# Patient Record
Sex: MALE | Race: WHITE | NOT HISPANIC OR LATINO
[De-identification: names, ages, dates, MRNs, and addresses within clinical notes are randomized per-mention and may not be internally consistent; named-entity substitution may affect disease eponyms.]

---

## 2021-05-10 ENCOUNTER — NON-APPOINTMENT (OUTPATIENT)
Age: 81
End: 2021-05-10

## 2021-05-10 DIAGNOSIS — Z86.79 PERSONAL HISTORY OF OTHER DISEASES OF THE CIRCULATORY SYSTEM: ICD-10-CM

## 2021-05-10 DIAGNOSIS — Z82.49 FAMILY HISTORY OF ISCHEMIC HEART DISEASE AND OTHER DISEASES OF THE CIRCULATORY SYSTEM: ICD-10-CM

## 2021-05-10 DIAGNOSIS — Z78.9 OTHER SPECIFIED HEALTH STATUS: ICD-10-CM

## 2021-05-10 DIAGNOSIS — F01.50 VASCULAR DEMENTIA W/OUT BEHAVIORAL DISTURBANCE: ICD-10-CM

## 2021-05-10 DIAGNOSIS — Z86.73 PERSONAL HISTORY OF TRANSIENT ISCHEMIC ATTACK (TIA), AND CEREBRAL INFARCTION W/OUT RESIDUAL DEFICITS: ICD-10-CM

## 2021-05-10 DIAGNOSIS — Z85.828 PERSONAL HISTORY OF OTHER MALIGNANT NEOPLASM OF SKIN: ICD-10-CM

## 2021-05-10 DIAGNOSIS — Z86.69 PERSONAL HISTORY OF OTHER DISEASES OF THE NERVOUS SYSTEM AND SENSE ORGANS: ICD-10-CM

## 2021-05-10 DIAGNOSIS — Z86.59 PERSONAL HISTORY OF OTHER MENTAL AND BEHAVIORAL DISORDERS: ICD-10-CM

## 2021-05-10 DIAGNOSIS — Z87.09 PERSONAL HISTORY OF OTHER DISEASES OF THE RESPIRATORY SYSTEM: ICD-10-CM

## 2021-05-10 DIAGNOSIS — Z87.448 PERSONAL HISTORY OF OTHER DISEASES OF URINARY SYSTEM: ICD-10-CM

## 2021-05-10 DIAGNOSIS — I10 ESSENTIAL (PRIMARY) HYPERTENSION: ICD-10-CM

## 2021-05-10 DIAGNOSIS — Z87.39 PERSONAL HISTORY OF OTHER DISEASES OF THE MUSCULOSKELETAL SYSTEM AND CONNECTIVE TISSUE: ICD-10-CM

## 2021-05-10 DIAGNOSIS — M54.18 RADICULOPATHY, SACRAL AND SACROCOCCYGEAL REGION: ICD-10-CM

## 2021-05-10 DIAGNOSIS — Z87.438 PERSONAL HISTORY OF OTHER DISEASES OF MALE GENITAL ORGANS: ICD-10-CM

## 2021-05-10 DIAGNOSIS — Z87.2 PERSONAL HISTORY OF DISEASES OF THE SKIN AND SUBCUTANEOUS TISSUE: ICD-10-CM

## 2021-05-10 DIAGNOSIS — M10.9 GOUT, UNSPECIFIED: ICD-10-CM

## 2021-05-10 DIAGNOSIS — E03.9 HYPOTHYROIDISM, UNSPECIFIED: ICD-10-CM

## 2021-05-10 PROBLEM — Z00.00 ENCOUNTER FOR PREVENTIVE HEALTH EXAMINATION: Status: ACTIVE | Noted: 2021-05-10

## 2021-05-10 RX ORDER — LOSARTAN POTASSIUM 100 MG/1
100 TABLET, FILM COATED ORAL
Refills: 0 | Status: ACTIVE | COMMUNITY

## 2021-05-10 RX ORDER — SIMVASTATIN 40 MG/1
40 TABLET, FILM COATED ORAL
Refills: 0 | Status: ACTIVE | COMMUNITY

## 2021-05-10 RX ORDER — ALBUTEROL SULFATE 108 UG/1
108 (90 BASE) AEROSOL, METERED RESPIRATORY (INHALATION)
Refills: 0 | Status: ACTIVE | COMMUNITY

## 2021-05-10 RX ORDER — TAMSULOSIN HCL 0.4 MG
0.4 CAPSULE ORAL
Refills: 0 | Status: ACTIVE | COMMUNITY

## 2021-05-10 RX ORDER — FLUTICASONE PROPIONATE 50 MCG
50 SPRAY, SUSPENSION NASAL
Refills: 0 | Status: ACTIVE | COMMUNITY

## 2021-05-13 ENCOUNTER — APPOINTMENT (OUTPATIENT)
Dept: GASTROENTEROLOGY | Facility: CLINIC | Age: 81
End: 2021-05-13
Payer: MEDICARE

## 2021-05-13 VITALS
WEIGHT: 224 LBS | HEART RATE: 80 BPM | TEMPERATURE: 98.2 F | DIASTOLIC BLOOD PRESSURE: 70 MMHG | HEIGHT: 70 IN | BODY MASS INDEX: 32.07 KG/M2 | SYSTOLIC BLOOD PRESSURE: 124 MMHG

## 2021-05-13 PROBLEM — Z87.438 HISTORY OF BENIGN PROSTATIC HYPERPLASIA: Status: RESOLVED | Noted: 2021-05-13 | Resolved: 2021-05-13

## 2021-05-13 PROBLEM — E03.9 PRIMARY HYPOTHYROIDISM: Status: RESOLVED | Noted: 2021-05-13 | Resolved: 2021-05-13

## 2021-05-13 PROBLEM — Z87.2 HISTORY OF SEBORRHEA: Status: RESOLVED | Noted: 2021-05-13 | Resolved: 2021-05-13

## 2021-05-13 PROBLEM — Z86.59 HISTORY OF ANXIETY: Status: RESOLVED | Noted: 2021-05-13 | Resolved: 2021-05-13

## 2021-05-13 PROBLEM — F01.50 VASCULAR DEMENTIA: Status: RESOLVED | Noted: 2021-05-13 | Resolved: 2021-05-13

## 2021-05-13 PROCEDURE — 99205 OFFICE O/P NEW HI 60 MIN: CPT

## 2021-05-13 PROCEDURE — 99072 ADDL SUPL MATRL&STAF TM PHE: CPT

## 2021-05-13 RX ORDER — CLOPIDOGREL 75 MG/1
75 TABLET, FILM COATED ORAL
Refills: 0 | Status: DISCONTINUED | COMMUNITY
End: 2021-05-13

## 2021-05-13 RX ORDER — DONEPEZIL HYDROCHLORIDE 5 MG/1
5 TABLET, FILM COATED ORAL
Refills: 0 | Status: ACTIVE | COMMUNITY

## 2021-05-13 RX ORDER — FUROSEMIDE 40 MG/1
40 TABLET ORAL
Refills: 0 | Status: ACTIVE | COMMUNITY

## 2021-05-13 RX ORDER — METOPROLOL TARTRATE 25 MG/1
25 TABLET, FILM COATED ORAL
Refills: 0 | Status: DISCONTINUED | COMMUNITY
End: 2021-05-13

## 2021-05-13 RX ORDER — ALLOPURINOL 300 MG/1
300 TABLET ORAL
Refills: 0 | Status: ACTIVE | COMMUNITY

## 2021-05-13 RX ORDER — FLUOXETINE HYDROCHLORIDE 10 MG/1
10 CAPSULE ORAL
Refills: 0 | Status: ACTIVE | COMMUNITY

## 2021-05-13 RX ORDER — METOPROLOL TARTRATE 50 MG/1
50 TABLET, FILM COATED ORAL
Refills: 0 | Status: ACTIVE | COMMUNITY

## 2021-05-13 RX ORDER — COLCHICINE 0.6 MG/1
0.6 TABLET ORAL
Refills: 0 | Status: DISCONTINUED | COMMUNITY
End: 2021-05-13

## 2021-05-13 RX ORDER — LEVOTHYROXINE SODIUM 0.1 MG/1
100 TABLET ORAL
Refills: 0 | Status: ACTIVE | COMMUNITY

## 2021-05-13 NOTE — ASSESSMENT
[FreeTextEntry1] : \par Rectal bleeding\par Change in Bowel Habits\par Constipation\par H/O Colon polyps\par R/O Colonic neoplasia\par CBC is stable\par for Colonoscopy\par \par * Diet: High Fiber,  Low Fat & Lactose free, Low FODMAPs--was reviewed & emphasized\par * Daily fluid intake was reviewed : 6  --  8 cups of decaffeinated fluid daily was emphasized\par * Regular aerobic exercise was emphasized\par * Probiotics  1  daily\par * Miralax 1/4 cap qd to start\par * Neuromodulation: reviewed but not required\par \par \par \par \par \par 1. Hemorrhoids:  well - controlled.  No pain,  swelling,  itch;   may be bleeding\par * Discussed the potential complications of thrombosis,  pain,  infection,  swelling, itching,  bleeding \par Recommendations: \par * High - Fiber Diet was reviewed and emphasized\par * 6  --  8 cups of decaffeinated fluid daily was emphasized \par * Sitz Bathes BID,  No:  Anusol HC  Suppos / Cream  ID BID -- was needed\par * No:  Tucks BID,   No:  Balneol Lotion,   No:  Calmoseptine Oint -- was needed ;    ++ Prep H prn\par * No:  need for  Colorectal surgical evaluation for possible ablation w Dr --  was emphasized\par \par \par \par \par \par \par 2. GERD:  well  -  controlled,  no ht burn,  dysphagia,  throat clear\par * No LPR,  No Mauricio’s w No Dysplasia,  No  Esophagitis grade: A--was found \par \par Recommend: \par * Anti-reflux diet & life-style changes reviewed & re-emphasized.  No  Bedge required\par * Weight reduction & regular exercise emphasized\par \par * No need for  PPI:  ,  No  H2B q HS:  ,  No Carafate  1 gram:   --was emphasized\par I reviewed & summarized the prospective randomized & retrospective non-randomized studies\par looking at potential long term SE's of PPIs, w special attention to associations & actual cause\par as related to GI infections, bone loss, cognitive changes, KD, Covid, vitamin & electrolyte deficiencies\par questions were answered, pt advised that PPIs should be used when needed as indicated by their\par clinical indication and response and tapering off is always the goal if possible. pt understood.\par \par * No F/U  EGD:  [      ] mo.  /  [      ] yr  --for Barretts screening / surveillance \par * No  need for pH Monitor,  No  Manometry,  No  Esophagram -- was emphasized \par * No  need for ENT  eval/F/U,  No  need for Surgical  eval  --  was emphasized \par \par \par \par \par \par 3. Colorectal Cancer Screening:  to be evaluated\par   Utilizing teaching posters and anatomical models the following were discussed and emphasized with the patient in detail: \par * Discussed the pre-malignant potential of polyps\par * Discussed the importance of f/u surveillance / screening colonoscopy\par * High-Fiber Diet was reviewed and emphasized\par * Anti-oxidants and ASA/NSAID Therapy reviewed\par * Given age > 40-51 yo & +PH Colon Polyps\par * Recommend Colonoscopy\par * R/O  Colonic Neoplasia\par \par \par \par \par \par Informed Consent:\par * The risks & Benefits of  Colonoscopy were discussed w patient.\par * This included but was not limited to perforation, bleeding, sedation /med rxns possibly requiring surgery, blood transfusions, antibiotics & CPR/Intubation.\par * Pt. understands & agrees to the procedures.\par The following instructions in regards to the prep and medically essential ( cardiac, pulmonary, sz, psych, endocrine)  pre-op medication administration\par was reviewed and emphasized with the patient . \par * Pt. advised to D/C NSAIDs  7  Days  &   5  Days  PTP.\par * [ +++ ]  Dulcolax / Miralax / Mag. Citrate ,  [     ] Prepopik/ Clenpiq ,  [     ] Osmo Prep,  [    ] GoLytely,  prep. reviewed w Pt.\par * Hold  [           ] AM of procedure.\par * Hold  [           ] PM of procedure.\par * Take  [           ] PM of procedure.\par * Take  [  metop, losartan, proventil, flomax      ] AM of procedure.\par \par

## 2021-05-13 NOTE — HISTORY OF PRESENT ILLNESS
[de-identified] : \par \par This HPI reflects a summary and review of records : including previous and most recent  Labs, body imaging, consults and progress notes, operative and pathology reports, EKG reports, ED records, found in [employer], [employer] [facility],  [employer] and any additional records brought in by  the patient at the time of the visit.\par \par PCP: Tuan\par \par \par 81 yo M w h/o CAD--s/p stents,CVA, Vascular cognitive impairment,  PVD, CKD, HTN, HLD, PN, Sacral radic, Asthma, OA, Gout, seborrhea, hypothyroidism, BPH, Anxiety \par Allergies, BPH, Depression, SCC skin, \par Hemorrhoids, GERD\par \par \par Today:  Feeling well, no c/o , CP, SOB/ KIRKLAND, Cough, Wheeze, Palpitations, edema\par \par   Most recent labs  and imaging reviewed w patient: 7/18/19: tsh, lfts--wnl, Hgb=15, MCV=98, PLT=-112, BUN=17, creat=1.29, \par 5/12/21 Labs: Hgb=13, HCT=40, MCV=91, RDW=14, BUN=26, creat=1.32\par Pt a NHR at [facility],  has been since 1/2020 after CVA was difficult to attend and needed more care.   Recently started having blood in the stool and bowl, painless, no clots\par does a change in bowel habits: Incr constipation: BMS: # 2-3 w strain, after a laxative--> loose to watery stool.  No mucous\par \par * Abd pain-no\par * Nausea-no\par * Vomit-no\par * Early satiety--no\par * Belching-no\par * Hiccups--no\par * Regurgitation-no\par * Acid Taste / Water Brash-no\par * Ht burn-no\par * Throat Clearing-no\par * Post-Nasal Drip-no\par * Congestion-no\par * Globus-no\par * Cough-no\par * Wheeze / PC--no\par * Hoarseness-no\par * Dysphagia-no\par * BMs: #  2-3 w strain\par * Constipation-yes\par * Diarrhea-no\par * Bloating-no\par * Strain on Defecation--yes\par * Incompl Evac--no\par * Flatulence-no\par * Gurgling-no\par * Melena-no\par * BPBPR-yes\par * Anorexia-no\par * Wt. Loss-no,= 224\par \par \par

## 2021-05-25 ENCOUNTER — RESULT REVIEW (OUTPATIENT)
Age: 81
End: 2021-05-25

## 2021-05-26 ENCOUNTER — RESULT REVIEW (OUTPATIENT)
Age: 81
End: 2021-05-26

## 2021-05-27 ENCOUNTER — RESULT REVIEW (OUTPATIENT)
Age: 81
End: 2021-05-27

## 2021-05-28 ENCOUNTER — APPOINTMENT (OUTPATIENT)
Dept: GASTROENTEROLOGY | Facility: HOSPITAL | Age: 81
End: 2021-05-28

## 2021-06-03 ENCOUNTER — RESULT REVIEW (OUTPATIENT)
Age: 81
End: 2021-06-03

## 2022-09-26 ENCOUNTER — NON-APPOINTMENT (OUTPATIENT)
Age: 82
End: 2022-09-26

## 2022-11-03 ENCOUNTER — APPOINTMENT (OUTPATIENT)
Dept: GASTROENTEROLOGY | Facility: CLINIC | Age: 82
End: 2022-11-03

## 2022-11-03 VITALS
HEIGHT: 70 IN | DIASTOLIC BLOOD PRESSURE: 72 MMHG | SYSTOLIC BLOOD PRESSURE: 120 MMHG | HEART RATE: 72 BPM | WEIGHT: 224 LBS | BODY MASS INDEX: 32.07 KG/M2

## 2022-11-03 DIAGNOSIS — R19.4 CHANGE IN BOWEL HABIT: ICD-10-CM

## 2022-11-03 DIAGNOSIS — K59.00 CONSTIPATION, UNSPECIFIED: ICD-10-CM

## 2022-11-03 DIAGNOSIS — K64.8 OTHER HEMORRHOIDS: ICD-10-CM

## 2022-11-03 DIAGNOSIS — K63.5 POLYP OF COLON: ICD-10-CM

## 2022-11-03 DIAGNOSIS — Z12.11 ENCOUNTER FOR SCREENING FOR MALIGNANT NEOPLASM OF COLON: ICD-10-CM

## 2022-11-03 DIAGNOSIS — K21.9 GASTRO-ESOPHAGEAL REFLUX DISEASE W/OUT ESOPHAGITIS: ICD-10-CM

## 2022-11-03 DIAGNOSIS — K62.5 HEMORRHAGE OF ANUS AND RECTUM: ICD-10-CM

## 2022-11-03 PROCEDURE — 99215 OFFICE O/P EST HI 40 MIN: CPT

## 2022-11-03 NOTE — HISTORY OF PRESENT ILLNESS
[de-identified] : \par \par This HPI reflects a summary and review of records : including previous and most recent  Labs, body imaging, consults and progress notes, operative and pathology reports, EKG reports, ED records, found in [employer], [employer],  [employer] and any additional records brought in by  the patient at the time of the visit.\par \par PCP: Tuan\par \par \par 83 yo M w h/o CAD--s/p stents,CVA, Vascular cognitive impairment,  PVD, CKD, HTN, HLD, PN, Sacral radic, Asthma, OA, Gout, seborrhea, hypothyroidism, BPH, Anxiety \par Allergies, BPH, Depression, SCC skin, \par Colon Polyps, Hemorrhoids, GERD\par \par 5/13/21 :  [facility]   Most recent labs  reviewed w patient: 7/18/19: tsh, lfts--wnl, Hgb=15, MCV=98, PLT=-112, BUN=17, creat=1.29, \par 5/12/21 Labs: Hgb=13, HCT=40, MCV=91, RDW=14, BUN=26, creat=1.32\par Pt a NHR at [facility],  has been since 1/2020 after CVA was difficult to attend and needed more care.   Recently started having blood in the stool and bowl, painless, no clots\par does have a  change in bowel habits: Incr constipation: BMS: # 2-3 w strain, after a laxative--> loose to watery stool.  No mucous\par \par 5/28/21  Colonoscopy: 0.75 sig polyp: HP; 0.5cm rectal polyp: adenoma, 2nd deg int hemorrhoids\par \par 11/3/22  Today:  Feeling well, no c/o , CP, SOB/ KIRKLAND, Cough, Wheeze, Palpitations, edema\par \par   Today:  pt sent from [facility] of intermittent anorectal discomfort and itching\par               [facility]  Report--> states thrombosed hemorrhoid that was RX w HC cream prn\par               This happens about once a month.  pt denies any constipation, straining, brbpr\par \par * Abd pain-->no\par * Nausea--> no\par * Vomit--> no\par * Early satiety--> no\par * Belching--> no\par * Hiccups--> no\par * Regurgitation--> no\par * Acid Taste / Water Brash--> no\par * Ht burn--> no\par * Dysphagia--> no\par * Throat Clearing--> no\par * Hoarseness--> no\par * Post-Nasal Drip--> no\par * Congestion--> no\par * Globus--> no\par * Cough--> no\par * Wheeze / PC-> -no\par * BMs: # 4 qd\par * Constipation--> no\par * Diarrhea--> no\par * Bloating--> no\par * Strain on Defecation--> no\par * Incompl Evac--> no\par * Flatulence--> no\par * Gurgling--> no\par * Melena--> no\par * BPBPR-> -no\par * Anorexia--> no\par * Wt. Loss--> no\par \par

## 2022-11-03 NOTE — PHYSICAL EXAM
[No External Hemorrhoid] : no external hemorrhoids [No Rectal Mass] : no rectal mass [de-identified] : + 2 nd deg int.  hemorrhoids,  vivek-anal skin w/o inflammation or tenderness  [address] Detailed

## 2022-11-03 NOTE — ASSESSMENT
[FreeTextEntry1] : \par H/O Rectal bleeding-> resolved \par assoc w Change in Bowel Habits w Constipation--> better \par H/O Colon polyps\par \par \par \par 5/28/21 Colonoscopy:  0.75cm sig polyp: HP; 0. 5 cm rectal polyp:  Adenoma\par                2nd int hemorrhoids\par \par * Diet: High Fiber,  Low Fat & Lactose free, Low FODMAPs--was reviewed & emphasized\par * Daily fluid intake was reviewed : 6  --  8 cups of decaffeinated fluid daily was emphasized\par * Regular aerobic exercise was emphasized\par * Probiotics  1  daily\par * Miralax 1/4 cap qd to start\par \par \par \par \par \par \par 2. Hemorrhoids:  better,  w intermittent , swelling,  itch,  No bleeding\par * Discussed previously the  potential complications of thrombosis,  pain,  infection,  swelling, itching,  bleeding --none recently\par Recommendations: \par * Moderate- High  Fiber Diet was reviewed and emphasized\par * 6  --  8 cups of decaffeinated fluid daily was emphasized \par * Sitz Bathes as needed ,  ++   HC  Cream  IA BID -- was needed\par * No:  Tucks BID,  Balneol Lotion,   Calmoseptine Oint -- was needed ;   can use  Prep H prn\par *   for  Colorectal surgical evaluation for possible ablation \par \par \par \par \par \par \par \par \par 3. GERD:  well  -  controlled,  no ht burn,  dysphagia,  throat clear\par * No LPR,  Mauricio’s or   Esophagitis grade: A--was found \par \par Recommend: \par * Anti-reflux diet & life-style changes reviewed & re-emphasized.  No  Bedge required\par * Weight reduction & regular exercise emphasized\par \par * No need for  PPI: \par   can use  H2B prn \par * No F/U  EGD: --for Barretts screening / surveillance \par * No  need for pH Monitor,  Manometry,  Esophagram -- \par * No  need for ENT  eval/F/U,  or  Surgical  eval  -- \par \par \par \par \par \par 4. Colorectal Cancer Screening:  stable \par   Utilizing teaching posters and anatomical models the following were discussed and emphasized with the patient in detail: \par * Discussed the pre-malignant potential of polyps\par * Discussed the importance of f/u surveillance / screening colonoscopy\par * High-Fiber Diet was reviewed and emphasized\par * Anti-oxidants and ASA/NSAID Therapy reviewed\par * Given age > 81 yo & +PH Colon Polyps\par * Recommend Colonoscopy to  R/O  Colonic Neoplasia only if symptomatic \par  However this is an individual decision based on physiological age, estimated life expectancy and\par co-morbidities which may reduce the benefit and incr the risk of anesthesia\par \par \par \par \par \par \par \par